# Patient Record
Sex: MALE | Race: WHITE | NOT HISPANIC OR LATINO | Employment: FULL TIME | URBAN - METROPOLITAN AREA
[De-identification: names, ages, dates, MRNs, and addresses within clinical notes are randomized per-mention and may not be internally consistent; named-entity substitution may affect disease eponyms.]

---

## 2018-11-01 RX ORDER — TOBRAMYCIN AND DEXAMETHASONE 3; 1 MG/ML; MG/ML
SUSPENSION/ DROPS OPHTHALMIC
Refills: 0 | COMMUNITY
Start: 2018-08-23 | End: 2018-11-02

## 2018-11-01 RX ORDER — ROSUVASTATIN CALCIUM 10 MG/1
10 TABLET, COATED ORAL
COMMUNITY
Start: 2016-01-22 | End: 2019-02-12

## 2018-11-01 RX ORDER — ASPIRIN 81 MG/1
81 TABLET ORAL
COMMUNITY

## 2018-11-02 ENCOUNTER — OFFICE VISIT (OUTPATIENT)
Dept: PULMONOLOGY | Facility: MEDICAL CENTER | Age: 63
End: 2018-11-02
Payer: COMMERCIAL

## 2018-11-02 VITALS
WEIGHT: 172 LBS | BODY MASS INDEX: 23.3 KG/M2 | DIASTOLIC BLOOD PRESSURE: 60 MMHG | SYSTOLIC BLOOD PRESSURE: 108 MMHG | HEART RATE: 68 BPM | RESPIRATION RATE: 16 BRPM | OXYGEN SATURATION: 94 % | TEMPERATURE: 98.3 F | HEIGHT: 72 IN

## 2018-11-02 DIAGNOSIS — G47.33 OSA (OBSTRUCTIVE SLEEP APNEA): Primary | ICD-10-CM

## 2018-11-02 PROCEDURE — 99203 OFFICE O/P NEW LOW 30 MIN: CPT | Performed by: INTERNAL MEDICINE

## 2018-11-02 NOTE — PROGRESS NOTES
Assessment/Plan:       Problem List Items Addressed This Visit        Respiratory    SABINO (obstructive sleep apnea) - Primary     Patient has a known history of obstructive sleep apnea diagnosed many years ago  Data is obtained from his prior sleep physician which showed that patient had evidence of mild obstructive sleep apnea with AHI of 12/hour and sleep fragmentation during his initial study  This study was performed in 2007  Patient was placed on auto CPAP of 7-9 cm of water  Supplies will be ordered for the patient today  No need for repeat titration study at this time  Also would recommend that his machine be checked by the DME company to assess its adequacy as it is at least 11years old  Relevant Orders    PAP DME Resupply/Reorder              All questions are answered to the patient's satisfaction and understanding  He verbalizes understanding  He is encouraged to call with any further questions or concerns  Portions of the record may have been created with voice recognition software  Occasional wrong word or "sound a like" substitutions may have occurred due to the inherent limitations of voice recognition software  Read the chart carefully and recognize, using context, where substitutions have occurred  a    Electronically Signed by Deb Hsu MD    ______________________________________________________________________    Chief Complaint:   Chief Complaint   Patient presents with    [facility]     Sleep Apnea         Patient ID: Frieda Estrada is a 66 y o  y o  male has a past medical history of Closed compression fracture of L1 lumbar vertebra ([address] ) (1983)  11/2/2018  Patient presents today for initial visit  Has a history of SABINO- diagnosed about 11 years ago  Got his most recent CPAP machine about 5 years ago  His CPAP currently works fine  And he has not been getting any equipment  He had some extra equipment  He uses a nasal mask  Unclear of pressure setting       Originally tested for SABINO due to snoring, witnessed apneas  With the CPAP this is now resolved  On average he gets about 7 hours of sleep at night  Uses machine for the entire duration of his sleep time  He has a habit reading the news and may sometimes not have the mask on  No abnormal movements during his sleep  HPI      Review of Systems   Constitutional: Negative for activity change, appetite change, chills, fatigue and fever  HENT: Negative for congestion, dental problem, postnasal drip, sinus pain, sneezing, sore throat and voice change  Eyes: Negative for visual disturbance  Respiratory:        See HPI   Cardiovascular: Negative for chest pain, palpitations and leg swelling  Gastrointestinal: Negative for abdominal pain, diarrhea, nausea and vomiting  Genitourinary: Negative for difficulty urinating and dysuria  Musculoskeletal: Negative for arthralgias and back pain  Skin: Negative for rash and wound  Allergic/Immunologic: Negative for environmental allergies and food allergies  Neurological: Negative for dizziness, numbness and headaches  Hematological: Negative for adenopathy  Psychiatric/Behavioral: Negative for agitation, behavioral problems and confusion  Social history: He reports that he has never smoked  He has never used smokeless tobacco  He reports that he drinks alcohol  He reports that he does not use drugs  Past surgical history:   Past Surgical History:   Procedure Laterality Date    CYST REMOVAL Left 2005    wrist    HERNIA REPAIR  1978     Family history:   Family History   Problem Relation Age of Onset    Sleep apnea Brother          There is no immunization history on file for this patient    Current Outpatient Prescriptions   Medication Sig Dispense Refill    aspirin (ECOTRIN LOW STRENGTH) 81 mg EC tablet Take 81 mg by mouth      rosuvastatin (CRESTOR) 10 MG tablet Take 10 mg by mouth      tobramycin-dexamethasone (TOBRADEX) ophthalmic suspension instill 1 drop into right eye four times a day AS DIRECTED  0     No current facility-administered medications for this visit  Allergies: Penicillins    Objective:  Vitals:    11/02/18 1050   BP: 108/60   BP Location: Right arm   Patient Position: Sitting   Cuff Size: Standard   Pulse: 68   Resp: 16   Temp: 98 3 °F (36 8 °C)   TempSrc: Oral   SpO2: 94%   Weight: 78 kg (172 lb)   Height: 6' (1 829 m)   Oxygen Therapy  SpO2: 94 %    Wt Readings from Last 3 Encounters:   11/02/18 78 kg (172 lb)     Body mass index is 23 33 kg/m²  Physical Exam   Constitutional: He is oriented to person, place, and time  He appears well-developed and well-nourished  No distress  HENT:   Head: Normocephalic and atraumatic  Mouth/Throat: Oropharynx is clear and moist  No oropharyngeal exudate  Eyes: Pupils are equal, round, and reactive to light  EOM are normal    Neck: Normal range of motion  Neck supple  Cardiovascular: Normal rate and regular rhythm  No murmur heard  Pulmonary/Chest: Effort normal and breath sounds normal  No respiratory distress  He has no wheezes  He has no rales  He exhibits no tenderness  Abdominal: Soft  Bowel sounds are normal  He exhibits no distension  There is no tenderness  Musculoskeletal: Normal range of motion  He exhibits no edema  Neurological: He is alert and oriented to person, place, and time  No cranial nerve deficit  Skin: Skin is warm and dry  He is not diaphoretic  Psychiatric: He has a normal mood and affect  His behavior is normal    Vitals reviewed

## 2018-11-02 NOTE — LETTER
November 5, 2018     [address]  [address]  [address] [place] [address]    Patient: Radha Ramirez   YOB: 1955   Date of Visit: 11/2/2018       Dear Dr Clark Media: Thank you for referring Dorothe Blizzard to me for evaluation  Below are my notes for this consultation  If you have questions, please do not hesitate to call me  I look forward to following your patient along with you  Sincerely,        Armin Murphy MD        CC: No Recipients  Armin Murphy MD  11/5/2018  8:54 AM  Sign at close encounter  Assessment/Plan:       Problem List Items Addressed This Visit        Respiratory    SABINO (obstructive sleep apnea) - Primary     Patient has a known history of obstructive sleep apnea diagnosed many years ago  Data is obtained from his prior sleep physician which showed that patient had evidence of mild obstructive sleep apnea with AHI of 12/hour and sleep fragmentation during his initial study  This study was performed in 2007  Patient was placed on auto CPAP of 7-9 cm of water  Supplies will be ordered for the patient today  No need for repeat titration study at this time  Also would recommend that his machine be checked by the DME company to assess its adequacy as it is at least 11years old  Relevant Orders    PAP DME Resupply/Reorder              All questions are answered to the patient's satisfaction and understanding  He verbalizes understanding  He is encouraged to call with any further questions or concerns  Portions of the record may have been created with voice recognition software  Occasional wrong word or "sound a like" substitutions may have occurred due to the inherent limitations of voice recognition software  Read the chart carefully and recognize, using context, where substitutions have occurred  a    Electronically Signed by Armin Murphy MD    ______________________________________________________________________    Chief Complaint:   Chief Complaint   Patient presents with    [facility]     Sleep Apnea         Patient ID: Fatmata Gonzalez is a 66 y o  y o  male has a past medical history of Closed compression fracture of L1 lumbar vertebra ([address] ) (1983)  11/2/2018  Patient presents today for initial visit  Has a history of SABINO- diagnosed about 11 years ago  Got his most recent CPAP machine about 5 years ago  His CPAP currently works fine  And he has not been getting any equipment  He had some extra equipment  He uses a nasal mask  Unclear of pressure setting  Originally tested for SABINO due to snoring, witnessed apneas  With the CPAP this is now resolved  On average he gets about 7 hours of sleep at night  Uses machine for the entire duration of his sleep time  He has a habit reading the news and may sometimes not have the mask on  No abnormal movements during his sleep  HPI      Review of Systems   Constitutional: Negative for activity change, appetite change, chills, fatigue and fever  HENT: Negative for congestion, dental problem, postnasal drip, sinus pain, sneezing, sore throat and voice change  Eyes: Negative for visual disturbance  Respiratory:        See HPI   Cardiovascular: Negative for chest pain, palpitations and leg swelling  Gastrointestinal: Negative for abdominal pain, diarrhea, nausea and vomiting  Genitourinary: Negative for difficulty urinating and dysuria  Musculoskeletal: Negative for arthralgias and back pain  Skin: Negative for rash and wound  Allergic/Immunologic: Negative for environmental allergies and food allergies  Neurological: Negative for dizziness, numbness and headaches  Hematological: Negative for adenopathy  Psychiatric/Behavioral: Negative for agitation, behavioral problems and confusion  Social history: He reports that he has never smoked  He has never used smokeless tobacco  He reports that he drinks alcohol  He reports that he does not use drugs      Past surgical history:   Past Surgical History:   Procedure Laterality Date    CYST REMOVAL Left 2005    wrist    HERNIA REPAIR  1978     Family history:   Family History   Problem Relation Age of Onset    Sleep apnea Brother          There is no immunization history on file for this patient  Current Outpatient Prescriptions   Medication Sig Dispense Refill    aspirin (ECOTRIN LOW STRENGTH) 81 mg EC tablet Take 81 mg by mouth      rosuvastatin (CRESTOR) 10 MG tablet Take 10 mg by mouth      tobramycin-dexamethasone (TOBRADEX) ophthalmic suspension instill 1 drop into right eye four times a day AS DIRECTED  0     No current facility-administered medications for this visit  Allergies: Penicillins    Objective:  Vitals:    11/02/18 1050   BP: 108/60   BP Location: Right arm   Patient Position: Sitting   Cuff Size: Standard   Pulse: 68   Resp: 16   Temp: 98 3 °F (36 8 °C)   TempSrc: Oral   SpO2: 94%   Weight: 78 kg (172 lb)   Height: 6' (1 829 m)   Oxygen Therapy  SpO2: 94 %    Wt Readings from Last 3 Encounters:   11/02/18 78 kg (172 lb)     Body mass index is 23 33 kg/m²  Physical Exam   Constitutional: He is oriented to person, place, and time  He appears well-developed and well-nourished  No distress  HENT:   Head: Normocephalic and atraumatic  Mouth/Throat: Oropharynx is clear and moist  No oropharyngeal exudate  Eyes: Pupils are equal, round, and reactive to light  EOM are normal    Neck: Normal range of motion  Neck supple  Cardiovascular: Normal rate and regular rhythm  No murmur heard  Pulmonary/Chest: Effort normal and breath sounds normal  No respiratory distress  He has no wheezes  He has no rales  He exhibits no tenderness  Abdominal: Soft  Bowel sounds are normal  He exhibits no distension  There is no tenderness  Musculoskeletal: Normal range of motion  He exhibits no edema  Neurological: He is alert and oriented to person, place, and time  No cranial nerve deficit     Skin: Skin is warm and dry  He is not diaphoretic  Psychiatric: He has a normal mood and affect  His behavior is normal    Vitals reviewed

## 2018-11-05 NOTE — ASSESSMENT & PLAN NOTE
Patient has a known history of obstructive sleep apnea diagnosed many years ago  Data is obtained from his prior sleep physician which showed that patient had evidence of mild obstructive sleep apnea with AHI of 12/hour and sleep fragmentation during his initial study  This study was performed in 2007  Patient was placed on auto CPAP of 7-9 cm of water  Supplies will be ordered for the patient today  No need for repeat titration study at this time  Also would recommend that his machine be checked by the DME company to assess its adequacy as it is at least 11years old

## 2019-05-13 ENCOUNTER — OFFICE VISIT (OUTPATIENT)
Dept: PHYSICAL THERAPY | Facility: CLINIC | Age: 64
End: 2019-05-13
Payer: COMMERCIAL

## 2019-05-13 DIAGNOSIS — M54.5 CHRONIC LOW BACK PAIN, UNSPECIFIED BACK PAIN LATERALITY, WITH SCIATICA PRESENCE UNSPECIFIED: Primary | ICD-10-CM

## 2019-05-13 DIAGNOSIS — G89.29 CHRONIC LOW BACK PAIN, UNSPECIFIED BACK PAIN LATERALITY, WITH SCIATICA PRESENCE UNSPECIFIED: Primary | ICD-10-CM

## 2019-05-13 PROCEDURE — 97161 PT EVAL LOW COMPLEX 20 MIN: CPT

## 2019-05-13 PROCEDURE — G8978 MOBILITY CURRENT STATUS: HCPCS

## 2019-05-13 PROCEDURE — G8979 MOBILITY GOAL STATUS: HCPCS

## 2019-05-14 ENCOUNTER — TRANSCRIBE ORDERS (OUTPATIENT)
Dept: PHYSICAL THERAPY | Facility: CLINIC | Age: 64
End: 2019-05-14

## 2019-05-14 DIAGNOSIS — M54.50 LOW BACK PAIN WITHOUT SCIATICA, UNSPECIFIED BACK PAIN LATERALITY, UNSPECIFIED CHRONICITY: Primary | ICD-10-CM

## 2019-05-22 ENCOUNTER — OFFICE VISIT (OUTPATIENT)
Dept: PHYSICAL THERAPY | Facility: CLINIC | Age: 64
End: 2019-05-22
Payer: COMMERCIAL

## 2019-05-22 DIAGNOSIS — M54.5 CHRONIC LOW BACK PAIN, UNSPECIFIED BACK PAIN LATERALITY, WITH SCIATICA PRESENCE UNSPECIFIED: Primary | ICD-10-CM

## 2019-05-22 DIAGNOSIS — G89.29 CHRONIC LOW BACK PAIN, UNSPECIFIED BACK PAIN LATERALITY, WITH SCIATICA PRESENCE UNSPECIFIED: Primary | ICD-10-CM

## 2019-05-22 PROCEDURE — 97110 THERAPEUTIC EXERCISES: CPT

## 2019-05-22 PROCEDURE — 97140 MANUAL THERAPY 1/> REGIONS: CPT

## 2019-05-29 ENCOUNTER — OFFICE VISIT (OUTPATIENT)
Dept: PHYSICAL THERAPY | Facility: CLINIC | Age: 64
End: 2019-05-29
Payer: COMMERCIAL

## 2019-05-29 DIAGNOSIS — M54.5 CHRONIC LOW BACK PAIN, UNSPECIFIED BACK PAIN LATERALITY, WITH SCIATICA PRESENCE UNSPECIFIED: Primary | ICD-10-CM

## 2019-05-29 DIAGNOSIS — G89.29 CHRONIC LOW BACK PAIN, UNSPECIFIED BACK PAIN LATERALITY, WITH SCIATICA PRESENCE UNSPECIFIED: Primary | ICD-10-CM

## 2019-05-29 PROCEDURE — 97110 THERAPEUTIC EXERCISES: CPT

## 2019-05-29 PROCEDURE — 97140 MANUAL THERAPY 1/> REGIONS: CPT

## 2019-06-05 ENCOUNTER — OFFICE VISIT (OUTPATIENT)
Dept: PHYSICAL THERAPY | Facility: CLINIC | Age: 64
End: 2019-06-05
Payer: COMMERCIAL

## 2019-06-05 DIAGNOSIS — G89.29 CHRONIC LOW BACK PAIN, UNSPECIFIED BACK PAIN LATERALITY, WITH SCIATICA PRESENCE UNSPECIFIED: Primary | ICD-10-CM

## 2019-06-05 DIAGNOSIS — M54.5 CHRONIC LOW BACK PAIN, UNSPECIFIED BACK PAIN LATERALITY, WITH SCIATICA PRESENCE UNSPECIFIED: Primary | ICD-10-CM

## 2019-06-05 PROCEDURE — 97110 THERAPEUTIC EXERCISES: CPT

## 2019-06-05 PROCEDURE — 97140 MANUAL THERAPY 1/> REGIONS: CPT

## 2019-06-11 ENCOUNTER — OFFICE VISIT (OUTPATIENT)
Dept: PHYSICAL THERAPY | Facility: CLINIC | Age: 64
End: 2019-06-11
Payer: COMMERCIAL

## 2019-06-11 DIAGNOSIS — G89.29 CHRONIC LOW BACK PAIN, UNSPECIFIED BACK PAIN LATERALITY, WITH SCIATICA PRESENCE UNSPECIFIED: Primary | ICD-10-CM

## 2019-06-11 DIAGNOSIS — M54.5 CHRONIC LOW BACK PAIN, UNSPECIFIED BACK PAIN LATERALITY, WITH SCIATICA PRESENCE UNSPECIFIED: Primary | ICD-10-CM

## 2019-06-11 PROCEDURE — 97112 NEUROMUSCULAR REEDUCATION: CPT

## 2019-06-11 PROCEDURE — 97140 MANUAL THERAPY 1/> REGIONS: CPT

## 2019-06-11 PROCEDURE — 97110 THERAPEUTIC EXERCISES: CPT

## 2019-06-13 ENCOUNTER — OFFICE VISIT (OUTPATIENT)
Dept: PHYSICAL THERAPY | Facility: CLINIC | Age: 64
End: 2019-06-13
Payer: COMMERCIAL

## 2019-06-13 DIAGNOSIS — M54.5 CHRONIC LOW BACK PAIN, UNSPECIFIED BACK PAIN LATERALITY, WITH SCIATICA PRESENCE UNSPECIFIED: Primary | ICD-10-CM

## 2019-06-13 DIAGNOSIS — G89.29 CHRONIC LOW BACK PAIN, UNSPECIFIED BACK PAIN LATERALITY, WITH SCIATICA PRESENCE UNSPECIFIED: Primary | ICD-10-CM

## 2019-06-13 PROCEDURE — 97112 NEUROMUSCULAR REEDUCATION: CPT

## 2019-06-13 PROCEDURE — 97140 MANUAL THERAPY 1/> REGIONS: CPT

## 2019-06-13 PROCEDURE — G8979 MOBILITY GOAL STATUS: HCPCS

## 2019-06-13 PROCEDURE — 97110 THERAPEUTIC EXERCISES: CPT

## 2019-06-13 PROCEDURE — G8978 MOBILITY CURRENT STATUS: HCPCS

## 2019-06-14 ENCOUNTER — TRANSCRIBE ORDERS (OUTPATIENT)
Dept: PHYSICAL THERAPY | Facility: CLINIC | Age: 64
End: 2019-06-14

## 2019-06-14 DIAGNOSIS — M54.5 LOW BACK PAIN, UNSPECIFIED BACK PAIN LATERALITY, UNSPECIFIED CHRONICITY, WITH SCIATICA PRESENCE UNSPECIFIED: Primary | ICD-10-CM

## 2019-06-27 ENCOUNTER — OFFICE VISIT (OUTPATIENT)
Dept: PHYSICAL THERAPY | Facility: CLINIC | Age: 64
End: 2019-06-27
Payer: COMMERCIAL

## 2019-06-27 DIAGNOSIS — G89.29 CHRONIC LOW BACK PAIN, UNSPECIFIED BACK PAIN LATERALITY, WITH SCIATICA PRESENCE UNSPECIFIED: Primary | ICD-10-CM

## 2019-06-27 DIAGNOSIS — M54.5 CHRONIC LOW BACK PAIN, UNSPECIFIED BACK PAIN LATERALITY, WITH SCIATICA PRESENCE UNSPECIFIED: Primary | ICD-10-CM

## 2019-06-27 PROCEDURE — 97112 NEUROMUSCULAR REEDUCATION: CPT

## 2019-06-27 PROCEDURE — 97110 THERAPEUTIC EXERCISES: CPT

## 2019-06-27 PROCEDURE — 97140 MANUAL THERAPY 1/> REGIONS: CPT

## 2019-07-01 ENCOUNTER — OFFICE VISIT (OUTPATIENT)
Dept: PHYSICAL THERAPY | Facility: CLINIC | Age: 64
End: 2019-07-01
Payer: COMMERCIAL

## 2019-07-01 DIAGNOSIS — G89.29 CHRONIC LOW BACK PAIN, UNSPECIFIED BACK PAIN LATERALITY, WITH SCIATICA PRESENCE UNSPECIFIED: Primary | ICD-10-CM

## 2019-07-01 DIAGNOSIS — M54.5 CHRONIC LOW BACK PAIN, UNSPECIFIED BACK PAIN LATERALITY, WITH SCIATICA PRESENCE UNSPECIFIED: Primary | ICD-10-CM

## 2019-07-01 PROCEDURE — 97112 NEUROMUSCULAR REEDUCATION: CPT

## 2019-07-01 PROCEDURE — 97110 THERAPEUTIC EXERCISES: CPT

## 2019-07-01 PROCEDURE — 97140 MANUAL THERAPY 1/> REGIONS: CPT

## 2019-07-01 NOTE — PROGRESS NOTES
Daily Note     Today's date: 2019  Patient name: Meseret Doll  : 1955  MRN: [id number]  Referring provider: Self, Referral  Dx:   Encounter Diagnosis   Name Primary?  Chronic low back pain, unspecified back pain laterality, with sciatica presence unspecified Yes       Start Time: 1600  Stop Time: 1645  Total time in clinic (min): 45 minutes    Subjective: Pt reports he has muscle soreness after his last visit, but denies back pain  Reports of R sided tightness today  Pain Ratin/10    Objective: See treatment diary below  Precautions: Standard    Specialty Daily Treatment Diary     Manual 19   STM Lumbar paraspinals Lumbar paraspinals Lumbar paraspinals  Lumbar paraspinals   L/S p-a mobs Grade II-III Gr III-IV Gr III-IV  Grade II-III   Man Flexibility Hamstrings/ Quads Hamstrings/ Quads Hamstrings/ Quads  Hamstrings/ Quads   MET  Abd/add                      Exercise Diary         Rec bike        TA activation Alternating leg lifts x10 bi    Alternating leg lifts x10 bi   Ball squeeze x10/x10 feet elevated 10x5"/LTR x10 ji 10x5"/LTR x10 ji  X10/x10 feet elevated   Clamshells x10 blue TB    x10 blue TB   LTR     10x5"   Bridges x10 abducted x10/SL x10 x10/SL x10  x10 abducted/x10 PB   PPU x10 x10 x10  x10   Hip Hinge STS chair x10 2x10 chair 2x10 chair  STS chair x10   Prone Hip Extension  Aquamans x15 ji Aquamans x15 ji     Scapular Squeezes No Money x15  Tubing ext SL x15 ji blue  No Money x15   Cat Camel x15 x15   x15   Palloff Press 3x5 ji blue band  High to low chop 1/2 kneel x15 ji Kieser 4 0  3x5 ji yellow band   Open Book  x15 ji red x15 ji red     Piriformis Stretch     2x20" ji   Lat band walks  4x20ft red 4x20ft red                     Modalities                            Assessment: Pt reports of fatigue with strengthening activity but denies pain  Discussed returning to light golf swinging while away this week        Plan: Continue per plan of care  Progress treatment as tolerated

## 2019-07-16 ENCOUNTER — OFFICE VISIT (OUTPATIENT)
Dept: PHYSICAL THERAPY | Facility: CLINIC | Age: 64
End: 2019-07-16
Payer: COMMERCIAL

## 2019-07-16 DIAGNOSIS — M54.5 CHRONIC LOW BACK PAIN, UNSPECIFIED BACK PAIN LATERALITY, WITH SCIATICA PRESENCE UNSPECIFIED: Primary | ICD-10-CM

## 2019-07-16 DIAGNOSIS — G89.29 CHRONIC LOW BACK PAIN, UNSPECIFIED BACK PAIN LATERALITY, WITH SCIATICA PRESENCE UNSPECIFIED: Primary | ICD-10-CM

## 2019-07-16 PROCEDURE — 97140 MANUAL THERAPY 1/> REGIONS: CPT

## 2019-07-16 NOTE — LETTER
2019    Ashleigh Gambino[address]    Patient: Brooklyn Gay   YOB: 1955   Date of Visit: 2019     Encounter Diagnosis     ICD-10-CM    1  Chronic low back pain, unspecified back pain laterality, with sciatica presence unspecified M54 5     G89 29        Dear Dr Radha Huitron:    Please review the attached Plan of Care from [address] [place] [address] recent visit  Please verify that you agree therapy should continue by signing the attached document and sending it back to our office  If you have any questions or concerns, please don't hesitate to call  Sincerely,    Bertram Perkins, PT      Referring Provider:      I certify that I have read the below Plan of Care and certify the need for these services furnished under this plan of treatment while under my care  Ashleigh Gambino[address]  [address]: [phone]          PT Re-Evaluation     Today's date: 2019  Patient name: Brooklyn Gay  : 1955  MRN: [id number]  Referring provider: Self, Referral  Dx:   Encounter Diagnosis     ICD-10-CM    1  Chronic low back pain, unspecified back pain laterality, with sciatica presence unspecified M54 5     G89 29        Start Time: 1645  Stop Time: 1730  Total time in clinic (min): 45 minutes    Assessment  Assessment details: To date, Ethan Hunter has received 9 Physical Therapy visits consisting of manual therapy techniques and therapeutic exercises for Chronic low back pain, unspecified back pain laterality, with sciatica presence unspecified  (primary encounter diagnosis)  Patient demonstrates decreased pain, increased strength, increased ROM and increased activity tolerance and has been able to return to forward bending and lifting without pain  Ethan Hunter remains unable to return to recreational activity including playing golf    Patient would benefit from continued skilled Physical Therapy services to progress towards prior level of function and independence with home exercise program   See updated goals below  Impairments: lacks appropriate home exercise program and poor posture   Understanding of Dx/Px/POC: good   Prognosis: good    Goals  Short Term Goals: Target Date 6/10/19  1  Initiate and advance HEP - achieved  2  Decrease pain to 3/10 at worst with HEP - achieved  3  Increase trunk AROM to </= 25% b/l lateral flexion - achieved  4  Increase bilateral hip extension MMT by at least 1/2 grade  - achieved  5  Pt will be able to squat with proper mechanics to reach the floor -  achieved  6  Pt will be able to lift a 10 lb object from the floor to waist height using proper form without PT cuing - achieved        Long Term Goals: Target Date 8/13/19  1  Indep with HEP  2  Decrease pain to 0/10 at worst with HEP - mostly achieved  3  Increase trunk AROM to [facility] in all planes - achieved  4  Increase bilateral LE MMT to at least 5/5 in all planes - mostly achieved  5  Pt will be able to demonstrate a full squat using proper form without PT cuing - mostly achieved  6  Pt will be able to lift a 25 lb object from the floor to waist height using proper form without PT cuing  7   Pt will be able to return to playing golf without increase of symptoms      Plan  Patient would benefit from: skilled PT  Planned modality interventions: cryotherapy, electrical stimulation/Russian stimulation and thermotherapy: hydrocollator packs  Planned therapy interventions: joint mobilization, manual therapy, patient education, postural training, activity modification, abdominal trunk stabilization, body mechanics training, flexibility, functional ROM exercises, graded exercise, home exercise program, neuromuscular re-education, strengthening, stretching, therapeutic activities, therapeutic exercise, motor coordination training, muscle pump exercises, balance/weight bearing training, ADL training and graded motor  Frequency: 2x week  Duration in weeks: 4  Plan of Care beginning date: 2019  Plan of Care expiration date: 2019  Treatment plan discussed with: patient        Subjective Evaluation    History of Present Illness  Mechanism of injury: Pt reports that pain initially began 10 years ago while carrying a toilet across a porch during home renovations  Pt states pain prevented him from going to work for a few weeks and he had difficulties with getting in and out of bed  Pt also reports that after that incident, he had numbness, pins and needles at the bottom of his L foot  About 1 year ago, pt states that he was bending down to put something in the  and the pain returned  Pt again had difficulty with getting in and out of bed  Pt occasionally has numbness in L foot but it diminishes with ambulation  Pt states that he had a nerve conduction test that was reportedly negative  Pain is at worst when bending down to  a pencil, making the bed, and putting dishes away  Pt is hesitant to return to golf due to lack of strength and stability  19: To date, pt has received 9 PT visits  Overall Ten Eisenberg reports of a 75% improvement in function since beginning PT  Pt demonstrates improved full lumbar AROM and denies pain with forward bending and lifting  Ten Eisenberg remains unable to return to PLOF including playing golf  Pt would benefit from continued skilled PT services 1-2x/week x 4 weeks to progress towards PLOF and indep with HEP                Recurrent probem    Quality of life: good    Pain  Current pain ratin  At best pain ratin  At worst pain ratin  Location: Low back  Quality: discomfort  Relieving factors: rest and medications  Aggravating factors: lifting (Forward bending)  Progression: improved    Social Support    Employment status: working [employer])  Hand dominance: right  Exercise history: plays golf      Diagnostic Tests  No diagnostic tests performed  Treatments  Current treatment: physical therapy  Patient Goals  Patient goals for therapy: decreased pain, increased motion, increased strength and return to sport/leisure activities (mostly achieved )  Patient goal: return to golf - progressing toward (Progressing towards)        Objective     Static Posture     Thoracic Spine  Hyperkyphosis  Active Range of Motion     Lumbar   Flexion: Active lumbar flexion: Fingertips to malleoli  WFL  Extension: Active lumbar extension: pain relieved in extension  WFL  Left lateral flexion:  WFL  Right lateral flexion:  WFL  Left rotation:  WFL  Right rotation:  [facility]    Strength/Myotome Testing     Left Hip   Planes of Motion   Flexion: 5  Extension: 5  Abduction: 4+  External rotation: 5  Internal rotation: 5    Right Hip   Planes of Motion   Flexion: 5  Extension: 5  Abduction: 5  External rotation: 5  Internal rotation: 5    Left Knee   Normal strength    Right Knee   Normal strength    Left Ankle/Foot   Normal strength    Right Ankle/Foot   Normal strength    Ambulation     Observational Gait     Additional Observational Gait Details  Pt ambulates with a mostly normalized pattern, slight decrease in trunk rotation    Functional Assessment        Forward Step Up 8"   Left Leg  Within functional limits  Right Leg  Within functional limits  Forward Step Down 8"   Left Leg  Within functional limits  Right Leg  Within functional limits  Single Leg Stance   Left: 30 seconds  Right: 30 seconds    Comments  Squat: Pt is able to demonstrate a full functional squat with ji UE extended    Denies c/o          Precautions: Standard    Specialty Daily Treatment Diary     Manual 6/13/19 6/27/19 7/1/19 7/16/19    STM Lumbar paraspinals Lumbar paraspinals Lumbar paraspinals Lumbar paraspinals    L/S p-a mobs Grade II-III Gr III-IV Gr III-IV Gr III-IV    Man Flexibility Hamstrings/ Quads Hamstrings/ Quads Hamstrings/ Quads Hamstrings/ Quads    MET  Abd/add Exercise Diary         Rec bike        TA activation Alternating leg lifts x10 bi       Ball squeeze x10/x10 feet elevated 10x5"/LTR x10 ji 10x5"/LTR x10 ji 10x5"/LTR x10 ji    Clamshells x10 blue TB       LTR        Bridges x10 abducted x10/SL x10 x10/SL x10 2x10/SL x10    PPU x10 x10 x10 x10    Hip Hinge STS chair x10 2x10 chair 2x10 chair 3x10    Prone Hip Extension  Aquamans x15 ji Aquamans x15 ji Aquamans x15 ji    Scapular Squeezes No Money x15  Tubing ext SL x15 ji blue     Cat Camel x15 x15      Palloff Press 3x5 ji blue band  High to low chop 1/2 kneel x15 ji Kieser 4 0     Open Book  x15 ji red x15 ji red x15 ji red    Piriformis Stretch        Lat band walks  4x20ft red 4x20ft red 4x20ft red                    Modalities

## 2019-07-16 NOTE — PROGRESS NOTES
PT Re-Evaluation     Today's date: 2019  Patient name: Rosa Gold  : 1955  MRN: [id number]  Referring provider: Self, Referral  Dx:   Encounter Diagnosis     ICD-10-CM    1  Chronic low back pain, unspecified back pain laterality, with sciatica presence unspecified M54 5     G89 29        Start Time: 1645  Stop Time: 1730  Total time in clinic (min): 45 minutes    Assessment  Assessment details: To date, Michael Carrasco has received 9 Physical Therapy visits consisting of manual therapy techniques and therapeutic exercises for Chronic low back pain, unspecified back pain laterality, with sciatica presence unspecified  (primary encounter diagnosis)  Patient demonstrates decreased pain, increased strength, increased ROM and increased activity tolerance and has been able to return to forward bending and lifting without pain  Michael Carrasco remains unable to return to recreational activity including playing golf  Patient would benefit from continued skilled Physical Therapy services to progress towards prior level of function and independence with home exercise program   See updated goals below  Impairments: lacks appropriate home exercise program and poor posture   Understanding of Dx/Px/POC: good   Prognosis: good    Goals  Short Term Goals: Target Date 6/10/19  1  Initiate and advance HEP - achieved  2  Decrease pain to 3/10 at worst with HEP - achieved  3  Increase trunk AROM to </= 25% b/l lateral flexion - achieved  4  Increase bilateral hip extension MMT by at least 1/2 grade  - achieved  5  Pt will be able to squat with proper mechanics to reach the floor -  achieved  6  Pt will be able to lift a 10 lb object from the floor to waist height using proper form without PT cuing - achieved        Long Term Goals: Target Date 19  1  Indep with HEP  2  Decrease pain to 0/10 at worst with HEP - mostly achieved  3  Increase trunk AROM to [facility] in all planes - achieved  4   Increase bilateral LE MMT to at least 5/5 in all planes - mostly achieved  5  Pt will be able to demonstrate a full squat using proper form without PT cuing - mostly achieved  6  Pt will be able to lift a 25 lb object from the floor to waist height using proper form without PT cuing  7  Pt will be able to return to playing golf without increase of symptoms      Plan  Patient would benefit from: skilled PT  Planned modality interventions: cryotherapy, electrical stimulation/Russian stimulation and thermotherapy: hydrocollator packs  Planned therapy interventions: joint mobilization, manual therapy, patient education, postural training, activity modification, abdominal trunk stabilization, body mechanics training, flexibility, functional ROM exercises, graded exercise, home exercise program, neuromuscular re-education, strengthening, stretching, therapeutic activities, therapeutic exercise, motor coordination training, muscle pump exercises, balance/weight bearing training, ADL training and graded motor  Frequency: 2x week  Duration in weeks: 4  Plan of Care beginning date: 7/16/2019  Plan of Care expiration date: 8/13/2019  Treatment plan discussed with: patient        Subjective Evaluation    History of Present Illness  Mechanism of injury: Pt reports that pain initially began 10 years ago while carrying a toilet across a porch during home renovations  Pt states pain prevented him from going to work for a few weeks and he had difficulties with getting in and out of bed  Pt also reports that after that incident, he had numbness, pins and needles at the bottom of his L foot  About 1 year ago, pt states that he was bending down to put something in the  and the pain returned  Pt again had difficulty with getting in and out of bed  Pt occasionally has numbness in L foot but it diminishes with ambulation  Pt states that he had a nerve conduction test that was reportedly negative   Pain is at worst when bending down to  a pencil, making the bed, and putting dishes away  Pt is hesitant to return to golf due to lack of strength and stability  19: To date, pt has received 9 PT visits  Overall Katiana Terrazas reports of a 75% improvement in function since beginning PT  Pt demonstrates improved full lumbar AROM and denies pain with forward bending and lifting  Katiana [place] remains unable to return to PLOF including playing golf  Pt would benefit from continued skilled PT services 1-2x/week x 4 weeks to progress towards PLOF and indep with HEP  Recurrent probem    Quality of life: good    Pain  Current pain ratin  At best pain ratin  At worst pain ratin  Location: Low back  Quality: discomfort  Relieving factors: rest and medications  Aggravating factors: lifting (Forward bending)  Progression: improved    Social Support    Employment status: working ([employer])  Hand dominance: right  Exercise history: plays golf      Diagnostic Tests  No diagnostic tests performed  Treatments  Current treatment: physical therapy  Patient Goals  Patient goals for therapy: decreased pain, increased motion, increased strength and return to sport/leisure activities (mostly achieved )  Patient goal: return to golf - progressing toward (Progressing towards)        Objective     Static Posture     Thoracic Spine  Hyperkyphosis  Active Range of Motion     Lumbar   Flexion: Active lumbar flexion: Fingertips to malleoli  WFL  Extension: Active lumbar extension: pain relieved in extension    WFL  Left lateral flexion:  WFL  Right lateral flexion:  WFL  Left rotation:  WFL  Right rotation:  [facility]    Strength/Myotome Testing     Left Hip   Planes of Motion   Flexion: 5  Extension: 5  Abduction: 4+  External rotation: 5  Internal rotation: 5    Right Hip   Planes of Motion   Flexion: 5  Extension: 5  Abduction: 5  External rotation: 5  Internal rotation: 5    Left Knee   Normal strength    Right Knee   Normal strength    Left Ankle/Foot Normal strength    Right Ankle/Foot   Normal strength    Ambulation     Observational Gait     Additional Observational Gait Details  Pt ambulates with a mostly normalized pattern, slight decrease in trunk rotation    Functional Assessment        Forward Step Up 8"   Left Leg  Within functional limits  Right Leg  Within functional limits  Forward Step Down 8"   Left Leg  Within functional limits  Right Leg  Within functional limits  Single Leg Stance   Left: 30 seconds  Right: 30 seconds    Comments  Squat: Pt is able to demonstrate a full functional squat with ji UE extended    Denies c/o          Precautions: Standard    Specialty Daily Treatment Diary     Manual 6/13/19 6/27/19 7/1/19 7/16/19    STM Lumbar paraspinals Lumbar paraspinals Lumbar paraspinals Lumbar paraspinals    L/S p-a mobs Grade II-III Gr III-IV Gr III-IV Gr III-IV    Man Flexibility Hamstrings/ Quads Hamstrings/ Quads Hamstrings/ Quads Hamstrings/ Quads    MET  Abd/add                      Exercise Diary         Rec bike        TA activation Alternating leg lifts x10 bi       Ball squeeze x10/x10 feet elevated 10x5"/LTR x10 ji 10x5"/LTR x10 ji 10x5"/LTR x10 ji    Clamshells x10 blue TB       LTR        Bridges x10 abducted x10/SL x10 x10/SL x10 2x10/SL x10    PPU x10 x10 x10 x10    Hip Hinge STS chair x10 2x10 chair 2x10 chair 3x10    Prone Hip Extension  Aquamans x15 ji Aquamans x15 ji Aquamans x15 ji    Scapular Squeezes No Money x15  Tubing ext SL x15 ji blue     Cat Camel x15 x15      Palloff Press 3x5 ji blue band  High to low chop 1/2 kneel x15 ji Kieser 4 0     Open Book  x15 ji red x15 ji red x15 ji red    Piriformis Stretch        Lat band walks  4x20ft red 4x20ft red 4x20ft red                    Modalities

## 2019-07-17 ENCOUNTER — TRANSCRIBE ORDERS (OUTPATIENT)
Dept: PHYSICAL THERAPY | Facility: CLINIC | Age: 64
End: 2019-07-17

## 2019-07-17 DIAGNOSIS — M54.5 LOW BACK PAIN, UNSPECIFIED BACK PAIN LATERALITY, UNSPECIFIED CHRONICITY, WITH SCIATICA PRESENCE UNSPECIFIED: Primary | ICD-10-CM

## 2019-07-18 ENCOUNTER — APPOINTMENT (OUTPATIENT)
Dept: PHYSICAL THERAPY | Facility: CLINIC | Age: 64
End: 2019-07-18
Payer: COMMERCIAL

## 2019-07-23 ENCOUNTER — OFFICE VISIT (OUTPATIENT)
Dept: PHYSICAL THERAPY | Facility: CLINIC | Age: 64
End: 2019-07-23
Payer: COMMERCIAL

## 2019-07-23 DIAGNOSIS — M54.5 CHRONIC LOW BACK PAIN, UNSPECIFIED BACK PAIN LATERALITY, WITH SCIATICA PRESENCE UNSPECIFIED: Primary | ICD-10-CM

## 2019-07-23 DIAGNOSIS — G89.29 CHRONIC LOW BACK PAIN, UNSPECIFIED BACK PAIN LATERALITY, WITH SCIATICA PRESENCE UNSPECIFIED: Primary | ICD-10-CM

## 2019-07-23 PROCEDURE — 97140 MANUAL THERAPY 1/> REGIONS: CPT

## 2019-07-23 PROCEDURE — 97110 THERAPEUTIC EXERCISES: CPT

## 2019-07-23 PROCEDURE — 97112 NEUROMUSCULAR REEDUCATION: CPT

## 2019-07-23 NOTE — PROGRESS NOTES
Daily Note     Today's date: 2019  Patient name: Garnetta Eisenmenger  : 1955  MRN: [id number]  Referring provider: Self, Referral  Dx:   Encounter Diagnosis   Name Primary?  Chronic low back pain, unspecified back pain laterality, with sciatica presence unspecified Yes       Start Time: 1730  Stop Time: 1825  Total time in clinic (min): 55 minutes    Subjective: Pt reports he felft some pain after carrying a 5 lb bucket of chlorine and trying to put in down  Swung a wedge without c/o last night    Pain Ratin/10    Objective: See treatment diary below  Precautions: Standard    Specialty Daily Treatment Diary     Manual 19   STM Lumbar paraspinals Lumbar paraspinals Lumbar paraspinals Lumbar paraspinals Lumbar paraspinals   L/S p-a mobs Grade II-III Gr III-IV Gr III-IV Gr III-IV Gr III-IV   Man Flexibility Hamstrings/ Quads Hamstrings/ Quads Hamstrings/ Quads Hamstrings/ Quads Hamstrings/ Quads   MET  Abd/add                      Exercise Diary         Rec bike        TA activation Alternating leg lifts x10 bi       Ball squeeze x10/x10 feet elevated 10x5"/LTR x10 ji 10x5"/LTR x10 ji 10x5"/LTR x10 ji 10x5"/LTR x10 ji   Clamshells x10 blue TB       LTR        Bridges x10 abducted x10/SL x10 x10/SL x10 2x10/SL x10 x10/SL x10 ji   PPU x10 x10 x10 x10 x15   Hip Hinge STS chair x10 2x10 chair 2x10 chair 3x10 2x10    Prone Hip Extension  Aquamans x15 ji Aquamans x15 ji Aquamans x15 ji Aquamans x15 ji   Scapular Squeezes No Money x15  Tubing ext SL x15 ji blue     Cat Camel x15 x15      Palloff Press 3x5 ji blue band  High to low chop 1/2 kneel x15 ji Kieser 4 0  High to low chop 1/2 kneel x15 ji Kieser 6 0   Open Book  x15 ji red x15 ji red x15 ji red Standing trunk rotation x15 ji yellow   Piriformis Stretch        Lat band walks  4x20ft red 4x20ft red 4x20ft red 6x20ft green   Suitcase carry     2x40 ft 15 lbs ji           Modalities Assessment: Reviewed lifting and carrying technique which pt was able to tolerate with cuing for abdominal brace and hip hinge  Demo full squat without cuing  Plan: Continue per plan of care  Progress treatment as tolerated

## 2019-07-25 ENCOUNTER — APPOINTMENT (OUTPATIENT)
Dept: PHYSICAL THERAPY | Facility: CLINIC | Age: 64
End: 2019-07-25
Payer: COMMERCIAL

## 2019-07-30 ENCOUNTER — OFFICE VISIT (OUTPATIENT)
Dept: PHYSICAL THERAPY | Facility: CLINIC | Age: 64
End: 2019-07-30
Payer: COMMERCIAL

## 2019-07-30 DIAGNOSIS — M54.5 CHRONIC LOW BACK PAIN, UNSPECIFIED BACK PAIN LATERALITY, WITH SCIATICA PRESENCE UNSPECIFIED: Primary | ICD-10-CM

## 2019-07-30 DIAGNOSIS — G89.29 CHRONIC LOW BACK PAIN, UNSPECIFIED BACK PAIN LATERALITY, WITH SCIATICA PRESENCE UNSPECIFIED: Primary | ICD-10-CM

## 2019-07-30 PROCEDURE — 97110 THERAPEUTIC EXERCISES: CPT

## 2019-07-30 PROCEDURE — 97140 MANUAL THERAPY 1/> REGIONS: CPT

## 2019-07-30 PROCEDURE — 97112 NEUROMUSCULAR REEDUCATION: CPT

## 2019-07-30 NOTE — PROGRESS NOTES
PT Discharge    Today's date: 2019  Patient name: Jack Eduardo  : 1955  MRN: [id number]  Referring provider: Self, Referral  Dx:   Encounter Diagnosis     ICD-10-CM    1  Chronic low back pain, unspecified back pain laterality, with sciatica presence unspecified M54 5     G89 29        Start Time: 1730  Stop Time: 1815  Total time in clinic (min): 45 minutes    Assessment  Assessment details: To date, Jack Eduardo has received 11 Physical Therapy visits consisting of manual therapy techniques, neuromuscular re-education and therapeutic exercises  Hanna Basilio has been able to return to prior level of function including playing golf without pain or limitation  HEP was reviewed which patient was able to demonstrate independently  Hanna Basilio is to be discharged from skilled Physical Therapy services at this time secondary to achievement of functional goals and independence with home exercise program (Exercise code: [id number])  Understanding of Dx/Px/POC: good   Prognosis: good    Goals  Short Term Goals: Target Date 6/10/19  1  Initiate and advance HEP - achieved  2  Decrease pain to 3/10 at worst with HEP - achieved  3  Increase trunk AROM to </= 25% b/l lateral flexion - achieved  4  Increase bilateral hip extension MMT by at least 1/2 grade  - achieved  5  Pt will be able to squat with proper mechanics to reach the floor - achieved  6  Pt will be able to lift a 10 lb object from the floor to waist height using proper form without PT cuing - achieved        Long Term Goals: Target Date 19  1  Indep with HEP - achieved  2  Decrease pain to 0/10 at worst with HEP - achieved  3  Increase trunk AROM to [facility] [place] in [facility] planes - achieved  4  Increase bilateral LE MMT to at least 5/5 in all planes - achieved  5  Pt will be able to demonstrate a full squat using proper form without PT cuing - achieved  6   Pt will be able to lift a 25 lb object from the floor to waist height using proper form without PT cuing - achieved  7  Pt will be able to return to playing golf without increase of symptoms - achieved      Plan  Plan details: Pt is discharged from skilled PT services  Planned therapy interventions: home exercise program  Treatment plan discussed with: patient        Subjective Evaluation    History of Present Illness  Mechanism of injury: Pt reports that pain initially began 10 years ago while carrying a toilet across a porch during home renovations  Pt states pain prevented him from going to work for a few weeks and he had difficulties with getting in and out of bed  Pt also reports that after that incident, he had numbness, pins and needles at the bottom of his L foot  About 1 year ago, pt states that he was bending down to put something in the  and the pain returned  Pt again had difficulty with getting in and out of bed  Pt occasionally has numbness in L foot but it diminishes with ambulation  Pt states that he had a nerve conduction test that was reportedly negative  Pain is at worst when bending down to  a pencil, making the bed, and putting dishes away  Pt is hesitant to return to golf due to lack of strength and stability  19: To date, Vaishali Mcdaniel has received 11 Physical Therapy visits consisting of manual therapy techniques, neuromuscular re-education and therapeutic exercises  Moody Mota has been able to return to prior level of function including playing golf without pain or limitation  HEP was reviewed which patient was able to demonstrate independently    Moody Mota is to be discharged from skilled Physical Therapy services at this time secondary to achievement of functional goals and independence with home exercise program                   Recurrent probem    Quality of life: good    Pain  Current pain ratin  At best pain ratin  At worst pain ratin  Location: Low back  Relieving factors: rest and medications (Alieve)  Progression: no change    Social Support    Employment status: working ([employer]  Hand dominance: right  Exercise history: plays golf      Diagnostic Tests  No diagnostic tests performed  Treatments  Current treatment: physical therapy  Patient Goals  Patient goals for therapy: decreased pain, increased motion, increased strength and return to sport/leisure activities (Achieved)  Patient goal: return to golf  (Achieved)        Objective     Active Range of Motion     Lumbar   Flexion: Active lumbar flexion: Fingertips to toes  WFL  Extension:  WFL  Left lateral flexion:  WFL  Right lateral flexion:  WFL  Left rotation:  WFL  Right rotation:  [facility]    Strength/Myotome Testing     Left Hip   Planes of Motion   Flexion: 5  Extension: 5  Abduction: 5  External rotation: 5  Internal rotation: 5    Right Hip   Planes of Motion   Flexion: 5  Extension: 5  Abduction: 5  External rotation: 5  Internal rotation: 5    Left Knee   Normal strength    Right Knee   Normal strength    Left Ankle/Foot   Normal strength    Right Ankle/Foot   Normal strength    Ambulation     Observational Gait     Additional Observational Gait Details  Pt ambulates with a mostly normalized pattern, slight decrease in trunk rotation and stride length     Functional Assessment        Forward Step Up 8"   Left Leg  Within functional limits  Right Leg  Within functional limits  Forward Step Down 8"   Left Leg  Within functional limits  Right Leg  Within functional limits       Single Leg Stance   Left: 30 seconds  Right: 30 seconds    Comments  Pt is able to demonstrate a full functional squat with good body mechanics            Flowsheet Rows      Most Recent Value   PT/OT G-Codes   Current Score  94   Projected Score  74   FOTO information reviewed  Yes   Assessment Type  Discharge          Precautions: Standard    Specialty Daily Treatment Diary     Manual 7/30/19 7/1/19 7/16/19 7/23/19   STM Lumbar paraspinals  Lumbar paraspinals Lumbar paraspinals Lumbar paraspinals   L/S p-a mobs Grade II-III  Gr III-IV Gr III-IV Gr III-IV   Man Flexibility Hamstrings/ Quads  Hamstrings/ Quads Hamstrings/ Quads Hamstrings/ Quads   MET                        Exercise Diary         Rec bike        TA activation Dead bug x15 ji       Ball squeeze 10x5"/LTR x10 ji  10x5"/LTR x10 ji 10x5"/LTR x10 ij 10x5"/LTR x10 ji   Clamshells        LTR        Bridges [id number] ji  x10/SL x10 2x10/SL x10 x10/SL x10 ji   PPU x10  x10 x10 x15   Hip Hinge 2x10  2x10 chair 3x10 2x10    Prone Hip Extension Superman x15 ji  Aquamans x15 ji Aquamans x15 ji Aquamans x15 ji   Scapular Squeezes   Tubing ext SL x15 ji blue     Cat Camel x15       Palloff Press High to low chop 1/2 kneel x15 ji Kieser 6 0  High to low chop 1/2 kneel x15 ji Kieser 4 0  High to low chop 1/2 kneel x15 ji Kieser 6 0   Open Book Standing trunk rotation x15 ji yellow  x15 ji red x15 ji red Standing trunk rotation x15 ji yellow   Piriformis Stretch        Lat band walks 6x20ft green  4x20ft red 4x20ft red 6x20ft green   Suitcase carry     2x40 ft 15 lbs ji           Modalities

## 2021-01-04 ENCOUNTER — IMMUNIZATIONS (OUTPATIENT)
Dept: FAMILY MEDICINE CLINIC | Facility: HOSPITAL | Age: 66
End: 2021-01-04

## 2021-01-04 DIAGNOSIS — Z23 ENCOUNTER FOR IMMUNIZATION: ICD-10-CM

## 2021-01-04 PROCEDURE — 0011A SARS-COV-2 / COVID-19 MRNA VACCINE (MODERNA) 100 MCG: CPT

## 2021-01-04 PROCEDURE — 91301 SARS-COV-2 / COVID-19 MRNA VACCINE (MODERNA) 100 MCG: CPT

## 2021-02-03 ENCOUNTER — IMMUNIZATIONS (OUTPATIENT)
Dept: FAMILY MEDICINE CLINIC | Facility: HOSPITAL | Age: 66
End: 2021-02-03

## 2021-02-03 DIAGNOSIS — Z23 ENCOUNTER FOR IMMUNIZATION: Primary | ICD-10-CM

## 2021-02-03 PROCEDURE — 0012A SARS-COV-2 / COVID-19 MRNA VACCINE (MODERNA) 100 MCG: CPT

## 2021-02-03 PROCEDURE — 91301 SARS-COV-2 / COVID-19 MRNA VACCINE (MODERNA) 100 MCG: CPT

## 2021-03-15 ENCOUNTER — NURSE TRIAGE (OUTPATIENT)
Dept: OTHER | Facility: OTHER | Age: 66
End: 2021-03-15

## 2021-03-15 DIAGNOSIS — Z11.59 SPECIAL SCREENING EXAMINATION FOR VIRAL DISEASE: ICD-10-CM

## 2021-03-15 DIAGNOSIS — Z11.59 SPECIAL SCREENING EXAMINATION FOR VIRAL DISEASE: Primary | ICD-10-CM

## 2021-03-15 PROCEDURE — U0005 INFEC AGEN DETEC AMPLI PROBE: HCPCS | Performed by: FAMILY MEDICINE

## 2021-03-15 PROCEDURE — U0003 INFECTIOUS AGENT DETECTION BY NUCLEIC ACID (DNA OR RNA); SEVERE ACUTE RESPIRATORY SYNDROME CORONAVIRUS 2 (SARS-COV-2) (CORONAVIRUS DISEASE [COVID-19]), AMPLIFIED PROBE TECHNIQUE, MAKING USE OF HIGH THROUGHPUT TECHNOLOGIES AS DESCRIBED BY CMS-2020-01-R: HCPCS | Performed by: FAMILY MEDICINE

## 2021-03-15 NOTE — TELEPHONE ENCOUNTER
Reason for Disposition   [1] COVID-19 diagnosed by positive lab test AND [2] mild symptoms (e g , cough, fever, others) AND [3] no complications or SOB    Additional Information   [1] Symptoms of COVID-19 (e g , cough, fever, SOB, or others) AND [2] HCP diagnosed COVID-19 based on symptoms   [1] Adult with possible COVID-19 symptoms AND [2] triager concerned about severity of symptoms or other causes    Protocols used: CORONAVIRUS (COVID-19) DIAGNOSED OR SUSPECTED-ADULT-[place], CORONAVIRUS (COVID-19) EXPOSURE-ADULT-[place]

## 2021-03-15 NOTE — TELEPHONE ENCOUNTER
Regarding: COVID, symptoms, unknown exposure, non [facility] PCP  ----- Message from Jim Hart sent at 3/15/2021 11:13 AM EDT -----  "Starting Friday morning throughout the weekend I had a severe headache, nasal congestion, nausea, diarrhea and fatigue; I did have both of my vaccines "  1  Were you within 6 feet or less, for up to 15 minutes or more with a person that has a confirmed COVID-19 test? no  2  What was the date of your exposure? Mo known expodsure  3  Are you experiencing any symptoms attributed to the virus?  (Assess for SOB, cough, fever, difficulty breathing) severe headache, nasal congestion, nausea, diarrhea and fatigue  4  HIGH RISK: Do you have any history heart or lung conditions, weakened immune system, diabetes, Asthma, CHF, HIV, COPD, Chemo, renal failure, sickle cell, etc?no  5   PREGNANCY: Are you pregnant or did you recently give birth? n/a

## 2021-03-16 LAB — SARS-COV-2 RNA RESP QL NAA+PROBE: NEGATIVE

## 2021-08-11 ENCOUNTER — PREP FOR PROCEDURE (OUTPATIENT)
Dept: GASTROENTEROLOGY | Facility: CLINIC | Age: 66
End: 2021-08-11

## 2021-08-11 DIAGNOSIS — Z86.010 HISTORY OF COLON POLYPS: Primary | ICD-10-CM

## 2022-05-11 ENCOUNTER — TELEPHONE (OUTPATIENT)
Dept: GASTROENTEROLOGY | Facility: AMBULARY SURGERY CENTER | Age: 67
End: 2022-05-11

## 2022-05-11 NOTE — TELEPHONE ENCOUNTER
Patients GI provider:  Dr Huong Neri    Number to return call: ( [phone]    Reason for call: Pt calling to have his colon results faxed to [phone] [facility]    Scheduled procedure/appointment date if applicable: N/A